# Patient Record
Sex: MALE | Race: WHITE | NOT HISPANIC OR LATINO | URBAN - METROPOLITAN AREA
[De-identification: names, ages, dates, MRNs, and addresses within clinical notes are randomized per-mention and may not be internally consistent; named-entity substitution may affect disease eponyms.]

---

## 2020-11-16 ENCOUNTER — OFFICE VISIT (OUTPATIENT)
Dept: ENDOCRINOLOGY | Facility: CLINIC | Age: 45
End: 2020-11-16

## 2020-11-16 ENCOUNTER — LAB (OUTPATIENT)
Dept: LAB | Facility: HOSPITAL | Age: 45
End: 2020-11-16

## 2020-11-16 VITALS
HEIGHT: 74 IN | SYSTOLIC BLOOD PRESSURE: 98 MMHG | DIASTOLIC BLOOD PRESSURE: 64 MMHG | HEART RATE: 60 BPM | WEIGHT: 209.2 LBS | BODY MASS INDEX: 26.85 KG/M2

## 2020-11-16 DIAGNOSIS — E05.00 GRAVES' DISEASE: ICD-10-CM

## 2020-11-16 DIAGNOSIS — E05.00 GRAVES' DISEASE: Primary | ICD-10-CM

## 2020-11-16 PROBLEM — E05.90 HYPERTHYROIDISM: Status: ACTIVE | Noted: 2020-11-16

## 2020-11-16 LAB
ALBUMIN SERPL-MCNC: 4.7 G/DL (ref 3.5–5.2)
ALBUMIN/GLOB SERPL: 1.6 G/DL
ALP SERPL-CCNC: 189 U/L (ref 39–117)
ALT SERPL W P-5'-P-CCNC: 30 U/L (ref 1–41)
ANION GAP SERPL CALCULATED.3IONS-SCNC: 7.6 MMOL/L (ref 5–15)
AST SERPL-CCNC: 28 U/L (ref 1–40)
BILIRUB SERPL-MCNC: 0.4 MG/DL (ref 0–1.2)
BUN SERPL-MCNC: 14 MG/DL (ref 6–20)
BUN/CREAT SERPL: 15.6 (ref 7–25)
CALCIUM SPEC-SCNC: 9.7 MG/DL (ref 8.6–10.5)
CHLORIDE SERPL-SCNC: 105 MMOL/L (ref 98–107)
CO2 SERPL-SCNC: 27.4 MMOL/L (ref 22–29)
CREAT SERPL-MCNC: 0.9 MG/DL (ref 0.76–1.27)
DEPRECATED RDW RBC AUTO: 48.1 FL (ref 37–54)
ERYTHROCYTE [DISTWIDTH] IN BLOOD BY AUTOMATED COUNT: 17.3 % (ref 12.3–15.4)
GFR SERPL CREATININE-BSD FRML MDRD: 91 ML/MIN/1.73
GLOBULIN UR ELPH-MCNC: 3 GM/DL
GLUCOSE SERPL-MCNC: 81 MG/DL (ref 65–99)
HCT VFR BLD AUTO: 51.2 % (ref 37.5–51)
HGB BLD-MCNC: 17.1 G/DL (ref 13–17.7)
MCH RBC QN AUTO: 27.8 PG (ref 26.6–33)
MCHC RBC AUTO-ENTMCNC: 33.4 G/DL (ref 31.5–35.7)
MCV RBC AUTO: 83.1 FL (ref 79–97)
PLATELET # BLD AUTO: 287 10*3/MM3 (ref 140–450)
PMV BLD AUTO: 8.7 FL (ref 6–12)
POTASSIUM SERPL-SCNC: 4.6 MMOL/L (ref 3.5–5.2)
PROT SERPL-MCNC: 7.7 G/DL (ref 6–8.5)
RBC # BLD AUTO: 6.16 10*6/MM3 (ref 4.14–5.8)
SODIUM SERPL-SCNC: 140 MMOL/L (ref 136–145)
T4 FREE SERPL-MCNC: 0.79 NG/DL (ref 0.93–1.7)
TSH SERPL DL<=0.05 MIU/L-ACNC: 0.18 UIU/ML (ref 0.27–4.2)
WBC # BLD AUTO: 7.97 10*3/MM3 (ref 3.4–10.8)

## 2020-11-16 PROCEDURE — 85027 COMPLETE CBC AUTOMATED: CPT

## 2020-11-16 PROCEDURE — 84443 ASSAY THYROID STIM HORMONE: CPT

## 2020-11-16 PROCEDURE — 80053 COMPREHEN METABOLIC PANEL: CPT

## 2020-11-16 PROCEDURE — 99213 OFFICE O/P EST LOW 20 MIN: CPT | Performed by: INTERNAL MEDICINE

## 2020-11-16 PROCEDURE — 84439 ASSAY OF FREE THYROXINE: CPT

## 2020-11-16 RX ORDER — ATENOLOL 25 MG/1
TABLET ORAL 2 TIMES DAILY
COMMUNITY
Start: 2020-09-11 | End: 2021-02-16 | Stop reason: SDUPTHER

## 2020-11-16 RX ORDER — METHIMAZOLE 10 MG/1
10 TABLET ORAL DAILY
COMMUNITY
Start: 2020-09-11 | End: 2021-11-15 | Stop reason: SDUPTHER

## 2020-11-16 NOTE — ASSESSMENT & PLAN NOTE
Check labs today.  Will send note about results.  Symptomatically some better but still having some issues with fatigue and brain fog.  No goiter or eye findings today.

## 2020-11-16 NOTE — PROGRESS NOTES
"     Office Note      Date: 2020  Patient Name: Eliseo Live  MRN: [id number]  : 1975    Chief Complaint   Patient presents with   • Graves' Disease       History of Present Illness:   Eliseo Live is a 45 y.o. male who presents for Graves' Disease    At the last visit the labs were c/w Graves' disease.  We started methimazole 20mg qd and atenolol 25mg BID.  He is tolerating this okay.  He notes resolution of tremor.  He notes fatigue.  He notes some trouble with wordfinding.  He has noted some pain in the right chest and shoulderblade.  He says it feels like a pinched nerve.  He has been able to gain back some weight.  He hasn't noted any change in the size of his neck.  He has noted some trouble with focusing his vision but it resolves with rest.      Subjective      Review of Systems:   Review of Systems   Constitutional: Positive for fatigue.   Cardiovascular: Positive for chest pain.   Gastrointestinal: Negative.    Endocrine: Negative.        The following portions of the patient's history were reviewed and updated as appropriate: allergies, current medications, past family history, past medical history, past social history, past surgical history and problem list.    Objective     Visit Vitals  BP 98/64 (BP Location: Right arm, Patient Position: Sitting, Cuff Size: Adult)   Pulse 60   Ht 188 cm (74\")   Wt 94.9 kg (209 lb 3.2 oz)   BMI 26.86 kg/m²       Physical Exam:  Physical Exam  Constitutional:       Appearance: Normal appearance.   Neck:      Thyroid: No thyroid mass, thyromegaly or thyroid tenderness.   Lymphadenopathy:      Cervical: No cervical adenopathy.   Neurological:      Mental Status: He is alert.         Labs:    TSH  No results found for: TSHBASE     Free T4  No results found for: FREET4    T3  No results found for: [id number]      TPO  No results found for: THYROIDAB    TG AB  No results found for: THGAB    TG  No results found for: THYROGLB    CBC w/DIFF  No results found for: " WBC, RBC, HGB, HCT, MCV, MCH, MCHC, RDW, RDWSD, MPV, PLT, NEUTRORELPCT, LYMPHORELPCT, MONORELPCT, EOSRELPCT, BASORELPCT, AUTOIGPER, NEUTROABS, LYMPHSABS, MONOSABS, EOSABS, BASOSABS, AUTOIGNUM, NRBC        Assessment / Plan      Assessment & Plan:  Problem List Items Addressed This Visit        Endocrine    Graves' disease - Primary    Current Assessment & Plan     Check labs today.  Will send note about results.  Symptomatically some better but still having some issues with fatigue and brain fog.  No goiter or eye findings today.         Relevant Medications    atenolol (TENORMIN) 25 MG tablet    methIMAzole (TAPAZOLE) 10 MG tablet    Other Relevant Orders    Comprehensive Metabolic Panel    CBC (No Diff)    TSH    T4, Free           Return in about 3 months (around 2/16/2021) for Recheck with TSH, free T4.    Jovanni Blackman MD   11/16/2020

## 2021-02-16 ENCOUNTER — TELEMEDICINE (OUTPATIENT)
Dept: ENDOCRINOLOGY | Facility: CLINIC | Age: 46
End: 2021-02-16

## 2021-02-16 VITALS
BODY MASS INDEX: 28.5 KG/M2 | SYSTOLIC BLOOD PRESSURE: 123 MMHG | DIASTOLIC BLOOD PRESSURE: 81 MMHG | HEART RATE: 69 BPM | WEIGHT: 222 LBS

## 2021-02-16 DIAGNOSIS — E05.00 GRAVES' DISEASE: Primary | ICD-10-CM

## 2021-02-16 PROCEDURE — 99441 PR PHYS/QHP TELEPHONE EVALUATION 5-10 MIN: CPT | Performed by: INTERNAL MEDICINE

## 2021-02-16 RX ORDER — ATENOLOL 25 MG/1
25 TABLET ORAL 2 TIMES DAILY
Qty: 60 TABLET | Refills: 5 | Status: SHIPPED | OUTPATIENT
Start: 2021-02-16 | End: 2021-06-28

## 2021-02-16 NOTE — PROGRESS NOTES
Office Note      Date: 2021  Patient Name: Eliseo Live  MRN: [id number]  : 1975    Chief Complaint   Patient presents with   • Graves' Disease       History of Present Illness:   Eliseo Live is a 45 y.o. male who presents for Graves' Disease    He remains on methimazole 10mg qd and atenolol 25mg BID.  He is tolerating this okay.  He notes resolution of tremor.  He notes much less fatigue.  He notes resolution trouble with wordfinding.  He has been able to gain back some weight.  He hasn't noted any change in the size of his neck.  He has noted some trouble with focusing his vision but it resolves with rest.           Subjective      Review of Systems:   Review of Systems   Constitutional: Negative.    Cardiovascular: Negative.    Gastrointestinal: Negative.    Endocrine: Negative.        The following portions of the patient's history were reviewed and updated as appropriate: allergies, current medications, past family history, past medical history, past social history, past surgical history and problem list.    Objective     Visit Vitals  /81   Pulse 69   Wt 101 kg (222 lb)   BMI 28.50 kg/m²       Physical Exam:  Physical Exam    Labs:    TSH  No results found for: TSHBASE     Free T4  Free T4   Date Value Ref Range Status   2020 0.79 (L) 0.93 - 1.70 ng/dL Final       T3  No results found for: [id number]      TPO  No results found for: THYROIDAB    TG AB  No results found for: THGAB    TG  No results found for: THYROGLB    CBC w/DIFF  Lab Results   Component Value Date    WBC 7.97 2020    RBC 6.16 (H) 2020    HGB 17.1 2020    HCT 51.2 (H) 2020    MCV 83.1 2020    MCH 27.8 2020    MCHC 33.4 2020    RDW 17.3 (H) 2020    RDWSD 48.1 2020    MPV 8.7 2020     2020           Assessment / Plan      Assessment & Plan:  Diagnoses and all orders for this visit:    1. Graves' disease (Primary)  Assessment &  Plan:  Symptomatically feeling better.  Will send lab slip to check TFTs.    Orders:  -     atenolol (TENORMIN) 25 MG tablet; Take 1 tablet by mouth 2 (two) times a day.  Dispense: 60 tablet; Refill: 5      Return in about 3 months (around 5/16/2021) for Recheck with TSH, free T4.    Jovanni Blackman MD   02/16/2021     You have chosen to receive care through a telephone visit. Do you consent to use a telephone visit for your medical care today? Yes    Unable to connect via [employer].  Total time spent on phone 9 minutes.

## 2021-02-17 DIAGNOSIS — E05.00 GRAVES' DISEASE: Primary | ICD-10-CM

## 2021-06-28 ENCOUNTER — LAB (OUTPATIENT)
Dept: LAB | Facility: HOSPITAL | Age: 46
End: 2021-06-28

## 2021-06-28 ENCOUNTER — OFFICE VISIT (OUTPATIENT)
Dept: ENDOCRINOLOGY | Facility: CLINIC | Age: 46
End: 2021-06-28

## 2021-06-28 VITALS
HEART RATE: 60 BPM | OXYGEN SATURATION: 98 % | BODY MASS INDEX: 26.18 KG/M2 | DIASTOLIC BLOOD PRESSURE: 78 MMHG | SYSTOLIC BLOOD PRESSURE: 115 MMHG | HEIGHT: 74 IN | WEIGHT: 204 LBS

## 2021-06-28 DIAGNOSIS — E05.00 GRAVES' DISEASE: ICD-10-CM

## 2021-06-28 DIAGNOSIS — E05.00 GRAVES' DISEASE: Primary | ICD-10-CM

## 2021-06-28 PROCEDURE — 84439 ASSAY OF FREE THYROXINE: CPT

## 2021-06-28 PROCEDURE — 84443 ASSAY THYROID STIM HORMONE: CPT

## 2021-06-28 PROCEDURE — 99213 OFFICE O/P EST LOW 20 MIN: CPT | Performed by: INTERNAL MEDICINE

## 2021-06-28 RX ORDER — CETIRIZINE HYDROCHLORIDE 10 MG/1
TABLET ORAL
COMMUNITY
Start: 2021-05-24 | End: 2022-07-05

## 2021-06-28 RX ORDER — ATORVASTATIN CALCIUM 10 MG/1
10 TABLET, FILM COATED ORAL NIGHTLY
COMMUNITY
Start: 2021-05-10

## 2021-06-28 RX ORDER — FLUTICASONE PROPIONATE 50 MCG
SPRAY, SUSPENSION (ML) NASAL
COMMUNITY
Start: 2021-05-24 | End: 2022-07-05

## 2021-06-28 RX ORDER — SILDENAFIL 100 MG/1
100 TABLET, FILM COATED ORAL AS NEEDED
COMMUNITY
Start: 2021-03-27

## 2021-06-28 RX ORDER — OMEPRAZOLE 40 MG/1
40 CAPSULE, DELAYED RELEASE ORAL DAILY
COMMUNITY
Start: 2021-05-10

## 2021-06-28 NOTE — PROGRESS NOTES
"     Office Note      Date: 2021  Patient Name: Eliseo Live  MRN: [id number]  : 1975    Chief Complaint   Patient presents with   • Graves' Disease       History of Present Illness:   Eliseo Live is a 46 y.o. male who presents for Graves' Disease    He remains on methimazole 10mg qd and atenolol 25mg BID.  He is tolerating this okay.  He notes resolution of tremor.  He some fatigue.  He notes resolution trouble with wordfinding.  His weight has stabilized.  He hasn't noted any change in the size of his neck.  He denies any eye complaints.      Subjective      Review of Systems:   Review of Systems   Constitutional: Positive for fatigue.   Cardiovascular: Negative.    Gastrointestinal: Negative.    Endocrine: Negative.        The following portions of the patient's history were reviewed and updated as appropriate: allergies, current medications, past family history, past medical history, past social history, past surgical history and problem list.    Objective     Visit Vitals  /78 (BP Location: Right arm, Patient Position: Sitting, Cuff Size: Adult)   Pulse 60   Ht 188 cm (74\")   Wt 92.5 kg (204 lb)   SpO2 98%   BMI 26.19 kg/m²       Physical Exam:  Physical Exam  Constitutional:       Appearance: Normal appearance.   Neck:      Thyroid: No thyroid mass, thyromegaly or thyroid tenderness.   Lymphadenopathy:      Cervical: No cervical adenopathy.   Neurological:      Mental Status: He is alert.         Labs:    TSH  No results found for: TSHBASE     Free T4  Free T4   Date Value Ref Range Status   2020 0.79 (L) 0.93 - 1.70 ng/dL Final       T3  No results found for: [id number]      TPO  No results found for: THYROIDAB    TG AB  No results found for: THGAB    TG  No results found for: THYROGLB    CBC w/DIFF  Lab Results   Component Value Date    WBC 7.97 2020    RBC 6.16 (H) 2020    HGB 17.1 2020    HCT 51.2 (H) 2020    MCV 83.1 2020    MCH 27.8 2020    " [facility]    RDW 17.3 (H) 11/16/2020    RDWSD 48.1 11/16/2020    MPV 8.7 11/16/2020     11/16/2020           Assessment / Plan      Assessment & Plan:  Diagnoses and all orders for this visit:    1. Graves' disease (Primary)  Assessment & Plan:  Symptomatically okay.  He misplaced his meds and was without meds for about 2 weeks.  He just started back today.  Since BP and pulse are on the lower side, will not resume atenolol.  Will contact him with results of labs.  May need to resume methimazole though.    Orders:  -     TSH; Future  -     T4, Free; Future      Return in about 3 months (around 9/28/2021) for Recheck with TSH, free T4, CMP, CBC.    Jovanni Blackman MD   06/28/2021

## 2021-06-28 NOTE — ASSESSMENT & PLAN NOTE
Symptomatically okay.  He misplaced his meds and was without meds for about 2 weeks.  He just started back today.  Since BP and pulse are on the lower side, will not resume atenolol.  Will contact him with results of labs.  May need to resume methimazole though.

## 2021-06-29 LAB
T4 FREE SERPL-MCNC: 1.14 NG/DL (ref 0.93–1.7)
TSH SERPL DL<=0.05 MIU/L-ACNC: 0.16 UIU/ML (ref 0.27–4.2)

## 2021-09-09 DIAGNOSIS — E05.00 GRAVES' DISEASE: ICD-10-CM

## 2021-09-09 RX ORDER — ATENOLOL 25 MG/1
25 TABLET ORAL 2 TIMES DAILY
Qty: 60 TABLET | Refills: 5 | Status: SHIPPED | OUTPATIENT
Start: 2021-09-09 | End: 2022-04-18

## 2021-11-12 ENCOUNTER — PATIENT MESSAGE (OUTPATIENT)
Dept: ENDOCRINOLOGY | Facility: CLINIC | Age: 46
End: 2021-11-12

## 2021-11-12 NOTE — TELEPHONE ENCOUNTER
From: Eliseo Live  To: Jovanni Blackman MD  Sent: 11/12/2021 3:23 PM EST  Subject: Appt for Monday at 3pm    Is it possible we just do a phone call instead of me driving to [place]? I had my primary complete a blood lab on Monday and was going to have them fax it to your office for you to look over and decide on any medication adjustments.

## 2021-11-15 ENCOUNTER — TELEMEDICINE (OUTPATIENT)
Dept: ENDOCRINOLOGY | Facility: CLINIC | Age: 46
End: 2021-11-15

## 2021-11-15 DIAGNOSIS — E05.00 GRAVES' DISEASE: Primary | ICD-10-CM

## 2021-11-15 PROCEDURE — 99213 OFFICE O/P EST LOW 20 MIN: CPT | Performed by: PHYSICIAN ASSISTANT

## 2021-11-15 RX ORDER — METHIMAZOLE 10 MG/1
10 TABLET ORAL DAILY
Qty: 30 TABLET | Refills: 5 | Status: SHIPPED | OUTPATIENT
Start: 2021-11-15 | End: 2022-07-05 | Stop reason: SDUPTHER

## 2021-11-15 NOTE — PROGRESS NOTES
Office Note      Date: 11/15/2021  Patient Name: Eliseo Live  MRN: [id number]  : 1975    Chief Complaint   Patient presents with   • Graves' Disease       History of Present Illness:   Eliseo Live is a 46 y.o. male who presents today for Graves' disease follow-up.  He consented for the visit to be conducted over the [employer] Zoom today.  Both parties were in the [facility].  He reports he is back on his methimazole 10 mg regularly.  He also remains on atenolol 25 mg 2 tablets daily.  Reports he feels great.  He had thyroid labs completed  and all were normal.  He denies any changes in his neck or eyes today.    Subjective      Review of Systems:  Review of Systems   Constitutional: Negative.    Cardiovascular: Negative.    Gastrointestinal: Negative.    Endocrine: Negative.    Neurological: Negative.        The following portions of the patient's history were reviewed and updated as appropriate: allergies, current medications, past family history, past medical history, past social history, past surgical history and problem list.    Objective     There were no vitals filed for this visit.  There is no height or weight on file to calculate BMI.    Physical Exam  Constitutional:       General: He is not in acute distress.     Appearance: Normal appearance.      Comments: Via [employer] Zoom video.   Neurological:      Mental Status: He is alert.             Assessment / Plan      Assessment & Plan:  1. Graves' disease  I reviewed lab results with patient.  Free T4: 1.03 (0.82-1.77)  TSH 2.95 (0.45-4.5)  Total T3 2.9 (2.0-4.4)  I reviewed lab results with Dr. Blackman prior to patient's visit.  Patient is feeling well we will continue the methimazole 10 mg daily.  He reports he has not had any issues with his heart rate so we will continue atenolol 25 mg 2 tablets daily.  I refilled his methimazole today.  He will plan a follow-up appointment in about 3 months for monitoring.  Patient to call  as needed.       Return in about 3 months (around 2/15/2022) for Recheck  okay for MyChart visit if patient prefers.    This note was dictated using Dragon voice recognition.    SEAN Burt   11/15/2021

## 2022-04-18 DIAGNOSIS — E05.00 GRAVES' DISEASE: ICD-10-CM

## 2022-04-18 RX ORDER — ATENOLOL 25 MG/1
25 TABLET ORAL
Qty: 60 TABLET | Refills: 2 | Status: SHIPPED | OUTPATIENT
Start: 2022-04-18 | End: 2022-07-05 | Stop reason: SDUPTHER

## 2022-06-29 LAB — HBA1C MFR BLD: 5.9 %

## 2022-07-05 ENCOUNTER — OFFICE VISIT (OUTPATIENT)
Dept: ENDOCRINOLOGY | Facility: CLINIC | Age: 47
End: 2022-07-05

## 2022-07-05 VITALS — WEIGHT: 220 LBS | HEIGHT: 74 IN | BODY MASS INDEX: 28.23 KG/M2

## 2022-07-05 DIAGNOSIS — E05.00 GRAVES' DISEASE: Primary | ICD-10-CM

## 2022-07-05 PROCEDURE — 99212 OFFICE O/P EST SF 10 MIN: CPT | Performed by: PHYSICIAN ASSISTANT

## 2022-07-05 RX ORDER — ACETAMINOPHEN 160 MG
1 TABLET,DISINTEGRATING ORAL DAILY
COMMUNITY
Start: 2022-05-27

## 2022-07-05 RX ORDER — METHIMAZOLE 10 MG/1
10 TABLET ORAL DAILY
Qty: 30 TABLET | Refills: 6 | Status: SHIPPED | OUTPATIENT
Start: 2022-07-05 | End: 2023-04-05

## 2022-07-05 RX ORDER — ATENOLOL 25 MG/1
25 TABLET ORAL DAILY
Qty: 30 TABLET | Refills: 6 | Status: SHIPPED | OUTPATIENT
Start: 2022-07-05

## 2022-07-05 RX ORDER — AZELASTINE HYDROCHLORIDE 137 UG/1
SPRAY, METERED NASAL DAILY PRN
COMMUNITY
Start: 2022-05-27

## 2022-07-05 RX ORDER — LEVOCETIRIZINE DIHYDROCHLORIDE 5 MG/1
5 TABLET, FILM COATED ORAL DAILY
COMMUNITY
Start: 2022-05-27

## 2022-07-05 NOTE — PROGRESS NOTES
"     Office Note      Date: 2022  Patient Name: Elsieo Live  MRN: [id number]  : 1975    Chief Complaint   Patient presents with   • Graves' Disease   • Hyperthyroidism       History of Present Illness:   Eliseo Live is a 47 y.o. male who presents today for Graves' disease.    He consented for the visit to be conducted over the telephone today.  Both parties were in the [facility].    Start time 1:38 PM   End time 1:43 PM   He remains on methimazole 10 mg daily he has been on this since the 2020.  He continues atenolol 25 mg daily.  He reports he feels well today with no concerns or complaints.  He recently had labs completed by his primary care physician and his TSH was 1.79.  His A1c was mildly elevated at 5.9%.  His primary care physician recommended healthy eating habits and physical activity and to continue to monitor this.  Denies any changes in his neck.    Subjective      Review of Systems:  Review of Systems   Constitutional: Negative.    Cardiovascular: Negative.    Gastrointestinal: Negative.    Endocrine: Negative.    Neurological: Negative.        The following portions of the patient's history were reviewed and updated as appropriate: allergies, current medications, past family history, past medical history, past social history, past surgical history and problem list.    Objective     Vitals:    22 1330   Weight: 99.8 kg (220 lb)   Height: 188 cm (74\")   PainSc: 0-No pain     Body mass index is 28.25 kg/m².    Physical Exam      Assessment / Plan      Assessment & Plan:  1. Graves' disease  His TSH on recent labs was 1.79.  He will continue methimazole 10 mg daily and atenolol 25 mg daily.  I refilled these prescriptions today.  We will plan to check back in 6 months for monitoring.  His CBC was within normal limits and his metabolic panel showed mildly elevated ALT.  We discussed that his mildly elevated hemoglobin A1c at 5.9% indicates prediabetes.  He will continue " to follow this with his primary care physician for now.       Return in about 6 months (around 1/5/2023) for Recheck sees me or Dr. Jovanni Blackman.    This note was dictated using Dragon voice recognition.    SEAN Burt   07/05/2022

## 2023-04-04 ENCOUNTER — TELEPHONE (OUTPATIENT)
Dept: ENDOCRINOLOGY | Facility: CLINIC | Age: 48
End: 2023-04-04
Payer: COMMERCIAL

## 2023-04-05 RX ORDER — METHIMAZOLE 10 MG/1
10 TABLET ORAL DAILY
Qty: 30 TABLET | Refills: 0 | Status: SHIPPED | OUTPATIENT
Start: 2023-04-05

## 2023-05-18 RX ORDER — METHIMAZOLE 10 MG/1
10 TABLET ORAL DAILY
Qty: 30 TABLET | Refills: 2 | Status: SHIPPED | OUTPATIENT
Start: 2023-05-18

## 2023-05-18 NOTE — TELEPHONE ENCOUNTER
Rx Refill Note    Requested Prescriptions     Pending Prescriptions Disp Refills   • methIMAzole (TAPAZOLE) 10 MG tablet [Pharmacy Med Name: METHIMAZOLE 10 MG TABLET 10 Tablet] 30 tablet 0     Sig: TAKE 1 TABLET BY MOUTH DAILY.        Last office visit with prescribing clinician: 7-5-22       Next office visit with prescribing clinician: Visit date not found     {    Ericka Ramirez MA  05/18/23, 16:22 EDT

## 2023-05-22 DIAGNOSIS — E05.00 GRAVES' DISEASE: ICD-10-CM

## 2023-05-22 RX ORDER — ATENOLOL 25 MG/1
25 TABLET ORAL DAILY
Qty: 30 TABLET | Refills: 0 | Status: SHIPPED | OUTPATIENT
Start: 2023-05-22

## 2023-05-22 NOTE — TELEPHONE ENCOUNTER
Rx Refill Note  Requested Prescriptions     Pending Prescriptions Disp Refills   • atenolol (TENORMIN) 25 MG tablet [Pharmacy Med Name: ATENOLOL 25 MG TABLET 25 Tablet] 30 tablet 6     Sig: TAKE 1 TABLET BY MOUTH DAILY.      Last office visit with prescribing clinician: 7/5/2022   Last telemedicine visit with prescribing clinician: 5/18/2023   Next office visit with prescribing clinician: Visit date not found                         Would you like a call back once the refill request has been completed: [] Yes [] No    If the office needs to give you a call back, can they leave a voicemail: [] Yes [] No    Carrillo Tabares MA  05/22/23, 16:25 EDT

## 2023-06-05 ENCOUNTER — OFFICE VISIT (OUTPATIENT)
Dept: ENDOCRINOLOGY | Facility: CLINIC | Age: 48
End: 2023-06-05
Payer: COMMERCIAL

## 2023-06-05 VITALS
HEART RATE: 78 BPM | DIASTOLIC BLOOD PRESSURE: 82 MMHG | HEIGHT: 74 IN | OXYGEN SATURATION: 98 % | BODY MASS INDEX: 28.23 KG/M2 | WEIGHT: 220 LBS | SYSTOLIC BLOOD PRESSURE: 136 MMHG

## 2023-06-05 DIAGNOSIS — E05.00 GRAVES' DISEASE: ICD-10-CM

## 2023-06-05 LAB
ALBUMIN SERPL-MCNC: 4.4 G/DL (ref 3.5–5.2)
ALBUMIN/GLOB SERPL: 1.4 G/DL
ALP SERPL-CCNC: 82 U/L (ref 39–117)
ALT SERPL W P-5'-P-CCNC: 32 U/L (ref 1–41)
ANION GAP SERPL CALCULATED.3IONS-SCNC: 12.7 MMOL/L (ref 5–15)
AST SERPL-CCNC: 30 U/L (ref 1–40)
BILIRUB SERPL-MCNC: 0.3 MG/DL (ref 0–1.2)
BUN SERPL-MCNC: 29 MG/DL (ref 6–20)
BUN/CREAT SERPL: 24.6 (ref 7–25)
CALCIUM SPEC-SCNC: 10.1 MG/DL (ref 8.6–10.5)
CHLORIDE SERPL-SCNC: 105 MMOL/L (ref 98–107)
CO2 SERPL-SCNC: 22.3 MMOL/L (ref 22–29)
CREAT SERPL-MCNC: 1.18 MG/DL (ref 0.76–1.27)
DEPRECATED RDW RBC AUTO: 43.2 FL (ref 37–54)
EGFRCR SERPLBLD CKD-EPI 2021: 75.6 ML/MIN/1.73
ERYTHROCYTE [DISTWIDTH] IN BLOOD BY AUTOMATED COUNT: 13.6 % (ref 12.3–15.4)
GLOBULIN UR ELPH-MCNC: 3.1 GM/DL
GLUCOSE SERPL-MCNC: 107 MG/DL (ref 65–99)
HCT VFR BLD AUTO: 49.9 % (ref 37.5–51)
HGB BLD-MCNC: 17 G/DL (ref 13–17.7)
MCH RBC QN AUTO: 29.6 PG (ref 26.6–33)
MCHC RBC AUTO-ENTMCNC: 34.1 G/DL (ref 31.5–35.7)
MCV RBC AUTO: 86.9 FL (ref 79–97)
PLATELET # BLD AUTO: 240 10*3/MM3 (ref 140–450)
PMV BLD AUTO: 9.2 FL (ref 6–12)
POTASSIUM SERPL-SCNC: 5.4 MMOL/L (ref 3.5–5.2)
PROT SERPL-MCNC: 7.5 G/DL (ref 6–8.5)
RBC # BLD AUTO: 5.74 10*6/MM3 (ref 4.14–5.8)
SODIUM SERPL-SCNC: 140 MMOL/L (ref 136–145)
T4 FREE SERPL-MCNC: 1.07 NG/DL (ref 0.93–1.7)
TSH SERPL DL<=0.05 MIU/L-ACNC: 2.13 UIU/ML (ref 0.27–4.2)
WBC NRBC COR # BLD: 8.37 10*3/MM3 (ref 3.4–10.8)

## 2023-06-05 PROCEDURE — 84439 ASSAY OF FREE THYROXINE: CPT | Performed by: PHYSICIAN ASSISTANT

## 2023-06-05 PROCEDURE — 80050 GENERAL HEALTH PANEL: CPT | Performed by: PHYSICIAN ASSISTANT

## 2023-06-05 RX ORDER — ATENOLOL 25 MG/1
25 TABLET ORAL DAILY
Qty: 90 TABLET | Refills: 3 | Status: SHIPPED | OUTPATIENT
Start: 2023-06-05

## 2023-06-05 RX ORDER — ATENOLOL 25 MG/1
25 TABLET ORAL DAILY
Qty: 90 TABLET | Refills: 3 | Status: SHIPPED | OUTPATIENT
Start: 2023-06-05 | End: 2023-06-05 | Stop reason: SDUPTHER

## 2023-06-05 NOTE — PROGRESS NOTES
"     Office Note      Date: 2023  Patient Name: Eliseo Live  MRN: [id number]  : 1975    Chief Complaint   Patient presents with    Graves' Disease       History of Present Illness:   Eliseo Live is a 48 y.o. male who presents today for follow-up for Graves' disease.  He remains on methimazole 10 mg daily.  He started methimazole fall .  He reports he feels well on this dose and denies any symptoms of hyper or hypothyroidism.  He also remains on atenolol 25 mg daily.  He denies any changes in his eyes or neck today.    Subjective      Review of Systems:  Review of Systems   Constitutional: Negative.    Cardiovascular: Negative.    Gastrointestinal: Negative.    Endocrine: Negative.    Neurological: Negative.      The following portions of the patient's history were reviewed and updated as appropriate: allergies, current medications, past family history, past medical history, past social history, past surgical history, and problem list.    Objective     Vitals:    23 0835   BP: 136/82   Pulse: 78   SpO2: 98%   Weight: 99.8 kg (220 lb)   Height: 188 cm (74\")     Body mass index is 28.25 kg/m².    Physical Exam  Vitals reviewed.   Constitutional:       General: He is not in acute distress.     Appearance: Normal appearance.   Neck:      Thyroid: No thyroid mass, thyromegaly or thyroid tenderness.   Lymphadenopathy:      Cervical: No cervical adenopathy.   Neurological:      Mental Status: He is alert.         Assessment / Plan      Assessment & Plan:  1. Graves' disease  He had TFTs in April which were within normal limits.  We will check TFTs today as well as a CMP and CBC.  Will send note with results and plan.  For now he will continue methimazole 10 mg daily.  I will refill his prescription once his labs have been reviewed.  I refilled his atenolol 25 mg daily today.  We will plan to check back in in 1 year unless anything changes in the interim.  Patient to call as needed.  - T4, Free; " Future  - TSH; Future  - Comprehensive Metabolic Panel; Future  - CBC (No Diff); Future  - atenolol (TENORMIN) 25 MG tablet; Take 1 tablet by mouth Daily.  Dispense: 90 tablet; Refill: 3  - T4, Free  - TSH  - Comprehensive Metabolic Panel  - CBC (No Diff)       Return in about 1 year (around 6/5/2024) for Recheck.    This note was dictated using Dragon voice recognition.    SEAN Burt   06/05/2023

## 2023-06-06 ENCOUNTER — PATIENT MESSAGE (OUTPATIENT)
Dept: ENDOCRINOLOGY | Facility: CLINIC | Age: 48
End: 2023-06-06
Payer: COMMERCIAL

## 2023-06-06 RX ORDER — METHIMAZOLE 5 MG/1
5 TABLET ORAL DAILY
Qty: 90 TABLET | Refills: 1 | Status: SHIPPED | OUTPATIENT
Start: 2023-06-06

## 2023-06-08 NOTE — TELEPHONE ENCOUNTER
From: Kaci Lopez  To: Eliseo Live  Sent: 6/6/2023 7:38 AM EDT  Subject: Lab results    Good morning,     I reviewed your thyroid labs and your labs are normal. Your TSH has crept up some, but is still normal. I think we could consider reducing your methimazole dose, but we would need to see you back in about 3-4 months for monitoring on the reduced dose. Do you want to try reducing the methimazole or do you prefer to continue the same for now? If we continue the same I think it would be a good idea to have you follow up in about 6 months for monitoring. Let me know what you would like to do.   Your blood cell counts were normal. Your creatinine( kidney test) is normal, but your BUN was mildly elevated this may indicate you were a little dehydrated when the labs were drawn. Your potassium was also mildly elevated. I do not see any medications on your med list that would cause this. I would suggest checking in with your PCP for repeat potassium in several months, or we can check it again at your follow up appointment.   Your liver enzymes were normal.   I hope this finds you well. Let me know if you have any questions or concerns. Kaci

## 2023-06-12 ENCOUNTER — OFFICE VISIT (OUTPATIENT)
Dept: CARDIOLOGY | Facility: CLINIC | Age: 48
End: 2023-06-12
Payer: COMMERCIAL

## 2023-06-12 VITALS
WEIGHT: 222 LBS | DIASTOLIC BLOOD PRESSURE: 70 MMHG | BODY MASS INDEX: 28.49 KG/M2 | SYSTOLIC BLOOD PRESSURE: 122 MMHG | HEART RATE: 64 BPM | HEIGHT: 74 IN | OXYGEN SATURATION: 99 %

## 2023-06-12 DIAGNOSIS — E78.5 HYPERLIPIDEMIA, UNSPECIFIED HYPERLIPIDEMIA TYPE: ICD-10-CM

## 2023-06-12 DIAGNOSIS — G47.10 HYPERSOMNIA, UNSPECIFIED: Primary | ICD-10-CM

## 2023-06-12 DIAGNOSIS — K21.00 GASTROESOPHAGEAL REFLUX DISEASE WITH ESOPHAGITIS WITHOUT HEMORRHAGE: ICD-10-CM

## 2023-06-12 NOTE — ASSESSMENT & PLAN NOTE
He reports excessive daytime sleepiness, snoring, restless sleep, and nonrestorative sleep.  He reports frequent awakenings for unknown reason.  We will check a home sleep test for further evaluation of sleep apnea.    We will plan a watch pad home sleep test.    We went on to discuss if his home sleep test is positive if he would be willing to start PAP therapy and possible options for treating an underlying sleep breathing disorder such as sleep apnea and he is in agreement to start PAP therapy if needed.  We discussed DME companies and his preference and he reports he would prefer to use the DME company [employer] in [place] that is close to his home.
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
## 2023-06-12 NOTE — ASSESSMENT & PLAN NOTE
We discussed that an untreated sleep apnea may potentiate hyperlipidemia.  He is on a statin therapy and he reports that he just had labs for further evaluation to see if he was well controlled on a statin.

## 2023-06-12 NOTE — ASSESSMENT & PLAN NOTE
He reports that in the past that he had acid reflux but he is taking medication omeprazole and he reports the symptoms are controlled.    We discussed that sleep position may improve this with head of bed elevated.

## 2023-08-21 ENCOUNTER — OFFICE VISIT (OUTPATIENT)
Dept: CARDIOLOGY | Facility: CLINIC | Age: 48
End: 2023-08-21
Payer: COMMERCIAL

## 2023-08-21 VITALS
BODY MASS INDEX: 28.75 KG/M2 | OXYGEN SATURATION: 98 % | SYSTOLIC BLOOD PRESSURE: 118 MMHG | HEIGHT: 74 IN | DIASTOLIC BLOOD PRESSURE: 70 MMHG | HEART RATE: 57 BPM | WEIGHT: 224 LBS

## 2023-08-21 DIAGNOSIS — G47.10 HYPERSOMNIA, UNSPECIFIED: ICD-10-CM

## 2023-08-21 DIAGNOSIS — J34.2 DEVIATED SEPTUM: ICD-10-CM

## 2023-08-21 DIAGNOSIS — G47.33 OSA (OBSTRUCTIVE SLEEP APNEA): Primary | ICD-10-CM

## 2023-08-21 PROCEDURE — 99214 OFFICE O/P EST MOD 30 MIN: CPT | Performed by: NURSE PRACTITIONER

## 2023-08-21 RX ORDER — ATORVASTATIN CALCIUM 20 MG/1
TABLET, FILM COATED ORAL
COMMUNITY
Start: 2023-07-20

## 2023-08-21 NOTE — PROGRESS NOTES
Follow-Up Sleep Consult     Date:   2023  Name: Eliseo Live  :   1975  PCP: Provider, No Known    Chief Complaint   Patient presents with    Follow-up    Sleep Apnea       Subjective     History of Present Illness  Eliseo Live is a 48 y.o. male who presents today for follow-up on JAYNE.  He presents back today to review the results of his home sleep test from 2023 and to review his first download on CPAP therapy.  He reports he has been on CPAP about 1 month.  He reports he is having some difficulty adjusting to the nasal mask as he cannot breathe out of his nose all the time.  He reports he can breathe out of his nose sometimes.  He is attempted to work with his DME to get a mask replaced.    He reports he already feels an improvement.  Family reports snoring is improved.  And he reports that his daytime sleepiness has improved.    Sleep History:     JAYNE AHI REM     JAYNE therapy autoCPAP 5-20cm       Current mask used is Nasal Cushion     Device Functioning Well: Yes  Mask Fit Comfortable: yes, but he cannot breath out of his nose.   Air Flow Comfortable: Yes  DME Helpful for Supplies: Yes  Sleep is rested: No, but he has improved sleep and less EDS and less snoring.         Device Download:           The patient's relevant past medical, surgical, family, and social history reviewed and updated in Epic as appropriate.    Past Medical History:   Diagnosis Date    Graves disease     Hyperlipidemia 2023    Hyperthyroidism     Sleep apnea      History reviewed. No pertinent surgical history.    No Known Allergies  Prior to Admission medications    Medication Sig Start Date End Date Taking? Authorizing Provider   atenolol (TENORMIN) 25 MG tablet Take 1 tablet by mouth Daily. 23  Yes Kaci Lopez PA   atorvastatin (LIPITOR) 20 MG tablet  23  Yes Elysia Rogers MD   Azelastine HCl 137 MCG/SPRAY solution Daily As Needed. 22  Yes Elysia Rogers MD  "  Cholecalciferol (Vitamin D3) 50 MCG (2000 UT) capsule Take 1 capsule by mouth Daily. 5/27/22  Yes Elysia Rogers MD   levocetirizine (XYZAL) 5 MG tablet Take 1 tablet by mouth Daily. 5/27/22  Yes Elysia Rogers MD   methIMAzole (TAPAZOLE) 5 MG tablet Take 1 tablet by mouth Daily. NEW DOSE 6/6/23  Yes Kaci Lopez PA   Multiple Vitamins-Minerals (MULTIVITAMIN ADULT, MINERALS, PO) Take 1 each by mouth Daily.   Yes Elysia Rogers MD   omeprazole (priLOSEC) 40 MG capsule Take 1 capsule by mouth Daily. 5/10/21  Yes Elysia Rogers MD   sildenafil (VIAGRA) 100 MG tablet Take 1 tablet by mouth As Needed. 3/27/21  Yes Elysia Rogers MD   atorvastatin (LIPITOR) 10 MG tablet Take 1 tablet by mouth Every Night. 5/10/21 8/21/23  Elysia Rogers MD     Family History   Problem Relation Age of Onset    Hyperthyroidism Maternal Aunt     Hyperthyroidism Maternal Grandmother     No Known Problems Father        Objective     Vital Signs:  /70 (BP Location: Left arm)   Pulse 57   Ht 188 cm (74\")   Wt 102 kg (224 lb)   SpO2 98%   BMI 28.76 kg/mý              Physical Exam  HENT:      Head: Normocephalic.      Nose: Nose normal.      Mouth/Throat:      Mouth: Mucous membranes are moist.   Pulmonary:      Effort: Pulmonary effort is normal.   Skin:     General: Skin is warm and dry.   Neurological:      Mental Status: He is alert and oriented to person, place, and time.   Psychiatric:         Mood and Affect: Mood normal.         Behavior: Behavior normal.         Thought Content: Thought content normal.           PAP download reviewed: 7/22 through 8/20/2023.  30-day download.  I have reviewed and interpreted the data on the download.         Assessment and Plan     Diagnoses and all orders for this visit:    1. JAYNE (obstructive sleep apnea) (Primary)  Assessment & Plan:  New diagnosis of JAYNE.  Baseline AHI 12.  This is mild.  AHI increases to 32 NREM and this is " severe.    He was called home sleep test results and asked to start CPAP therapy.    This is his first 1 month follow-up visit on new auto CPAP.    CPAP download is reviewed with good control but suboptimal compliance.    Plan: He is encouraged to increase CPAP usage, prescription to DME to change to a fullface mask, prescription to DME to adjust his auto CPAP settings to include the pressure 7 to 12 cm.    Follow-up in the sleep clinic in about 6 to 8 weeks for his 31 to 90-day compliance visit.     Orders:  -     Cancel: PAP Therapy  -     PAP Therapy  -     Ambulatory Referral to ENT (Otolaryngology)    2. Hypersomnia, unspecified  Assessment & Plan:  He reports that he has had an improvement in his excessive daytime sleepiness and an improvement in his snoring on CPAP therapy.    He is benefiting from PAP therapy and we plan to continue PAP therapy.    Mask adjustment and slight pressure adjustment per DME order.    Patient is encouraged to increase CPAP use to include all hours of sleep.    Follow-up in the sleep clinic in about 6 to 8 weeks and we will reevaluate this symptom.      3. Deviated septum  Assessment & Plan:  He reports that he does have a difficult time breathing through his nose.  He reports that he has a history of broken nose in the past.    He is interested in seeing an ears nose and throat specialist for further evaluation and treatment of deviated septum.  He is willing to consider surgical options if he may benefit from those.  He also has coexisting seasonal allergies on nasal spray and allergy med but still having difficulty controlling seasonal allergy.    Plan: Refer to ENT in [place] Dr. Abdullahi for further evaluation and treatment.    Orders:  -     Ambulatory Referral to ENT (Otolaryngology)        Report if any new/changing symptoms immediately, Sleep risks reviewed (driving, medical, sleep death, sedating agents), and Increase pap therapy usage         Follow Up  Return in  about 7 weeks (around 10/9/2023) for JAYNE/ 31-90 days .  Patient was given instructions and counseling regarding his condition or for health maintenance advice. Please see specific information pulled into the AVS if appropriate.

## 2023-08-21 NOTE — ASSESSMENT & PLAN NOTE
He reports that he has had an improvement in his excessive daytime sleepiness and an improvement in his snoring on CPAP therapy.    He is benefiting from PAP therapy and we plan to continue PAP therapy.    Mask adjustment and slight pressure adjustment per DME order.    Patient is encouraged to increase CPAP use to include all hours of sleep.    Follow-up in the sleep clinic in about 6 to 8 weeks and we will reevaluate this symptom.

## 2023-08-21 NOTE — ASSESSMENT & PLAN NOTE
New diagnosis of JAYNE.  Baseline AHI 12.  This is mild.  AHI increases to 32 NREM and this is severe.    He was called home sleep test results and asked to start CPAP therapy.    This is his first 1 month follow-up visit on new auto CPAP.    CPAP download is reviewed with good control but suboptimal compliance.    Plan: He is encouraged to increase CPAP usage, prescription to DME to change to a fullface mask, prescription to DME to adjust his auto CPAP settings to include the pressure 7 to 12 cm.    Follow-up in the sleep clinic in about 6 to 8 weeks for his 31 to 90-day compliance visit.

## 2023-08-21 NOTE — PATIENT INSTRUCTIONS
Quality Sleep Information, Adult  Quality sleep is important for your mental and physical health. It also improves your quality of life. Quality sleep means you:  Are asleep for most of the time you are in bed.  Fall asleep within 30 minutes.  Wake up no more than once a night.   Are awake for no longer than 20 minutes if you do wake up during the night.  Most adults need 7-8 hours of quality sleep each night.  How can poor sleep affect me?  If you do not get enough quality sleep, you may have:  Mood swings.  Daytime sleepiness.  Confusion.  Decreased reaction time.  Sleep disorders, such as insomnia and sleep apnea.  Difficulty with:  Solving problems.  Coping with stress.  Paying attention.  These issues may affect your performance and productivity at work, school, and at home. Lack of sleep may also put you at higher risk for accidents, suicide, and risky behaviors.  If you do not get quality sleep you may also be at higher risk for several health problems, including:  Infections.  Type 2 diabetes.  Heart disease.  High blood pressure.  Obesity.  Worsening of long-term conditions, like arthritis, kidney disease, depression, Parkinson's disease, and epilepsy.  What actions can I take to get more quality sleep?  A sign showing that a person should not smoke.         A sign showing that a person should not drink alcohol.      Stick to a sleep schedule. Go to sleep and wake up at about the same time each day. Do not try to sleep less on weekdays and make up for lost sleep on weekends. This does not work.  Try to get about 30 minutes of exercise on most days. Do not exercise 2-3 hours before going to bed.  Limit naps during the day to 30 minutes or less.  Do not use any products that contain nicotine or tobacco, such as cigarettes or e-cigarettes. If you need help quitting, ask your health care provider.  Do not drink caffeinated beverages for at least 8 hours before going to bed. Coffee, tea, and some sodas contain  caffeine.  Do not drink alcohol close to bedtime.  Do not eat large meals close to bedtime.  Do not take naps in the late afternoon.  Try to get at least 30 minutes of sunlight every day. Morning sunlight is best.  Make time to relax before bed. Reading, listening to music, or taking a hot bath promotes quality sleep.  Make your bedroom a place that promotes quality sleep. Keep your bedroom dark, quiet, and at a comfortable room temperature. Make sure your bed is comfortable. Take out sleep distractions like TV, a computer, smartphone, and bright lights.  If you are lying awake in bed for longer than 20 minutes, get up and do a relaxing activity until you feel sleepy.  Work with your health care provider to treat medical conditions that may affect sleeping, such as:  Nasal obstruction.  Snoring.  Sleep apnea and other sleep disorders.  Talk to your health care provider if you think any of your prescription medicines may cause you to have difficulty falling or staying asleep.  If you have sleep problems, talk with a sleep consultant. If you think you have a sleep disorder, talk with your health care provider about getting evaluated by a specialist.  Where to find more information  National Sleep Foundation website: https://sleepfoundation.org  National Heart, Lung, and Blood [place] (NHLBI): www.nhlbi.nih.gov/files/docs/public/sleep/healthy_sleep.pdf  Centers for Disease Control and Prevention (CDC): www.cdc.gov/sleep/index.html  Contact a health care provider if you:  Have trouble getting to sleep or staying asleep.  Often wake up very early in the morning and cannot get back to sleep.  Have daytime sleepiness.  Have daytime sleep attacks of suddenly falling asleep and sudden muscle weakness (narcolepsy).  Have a tingling sensation in your legs with a strong urge to move your legs (restless legs syndrome).  Stop breathing briefly during sleep (sleep apnea).  Think you have a sleep disorder or are taking a medicine  that is affecting your quality of sleep.  Summary  Most adults need 7-8 hours of quality sleep each night.  Getting enough quality sleep is an important part of health and well-being.  Make your bedroom a place that promotes quality sleep and avoid things that may cause you to have poor sleep, such as alcohol, caffeine, smoking, and large meals.  Talk to your health care provider if you have trouble falling asleep or staying asleep.  This information is not intended to replace advice given to you by your health care provider. Make sure you discuss any questions you have with your health care provider.  Document Revised: 10/17/2022 Document Reviewed: 10/17/2022  [employer] Patient Education c 2022 [employer] Sleep Apnea  Sleep apnea is a condition in which breathing pauses or becomes shallow during sleep. People with sleep apnea usually snore loudly. They may have times when they gasp and stop breathing for 10 seconds or more during sleep. This may happen many times during the night.  Sleep apnea disrupts your sleep and keeps your body from getting the rest that it needs. This condition can increase your risk of certain health problems, including:  Heart attack.  Stroke.  Obesity.  Type 2 diabetes.  Heart failure.  Irregular heartbeat.  High blood pressure.  The goal of treatment is to help you breathe normally again.  What are the causes?  A normal airway compared to a blocked airway.      The most common cause of sleep apnea is a collapsed or blocked airway.  There are three kinds of sleep apnea:  Obstructive sleep apnea. This kind is caused by a blocked or collapsed airway.  Central sleep apnea. This kind happens when the part of the brain that controls breathing does not send the correct signals to the muscles that control breathing.  Mixed sleep apnea. This is a combination of obstructive and central sleep apnea.  What increases the risk?  You are more likely to develop this condition if you:  Are  overweight.  Smoke.  Have a smaller than normal airway.  Are older.  Are male.  Drink alcohol.  Take sedatives or tranquilizers.  Have a family history of sleep apnea.  Have a tongue or tonsils that are larger than normal.  What are the signs or symptoms?  Symptoms of this condition include:  Trouble staying asleep.  Loud snoring.  Morning headaches.  Waking up gasping.  Dry mouth or sore throat in the morning.  Daytime sleepiness and tiredness.  If you have daytime fatigue because of sleep apnea, you may be more likely to have:  Trouble concentrating.  Forgetfulness.  Irritability or mood swings.  Personality changes.  Feelings of depression.  Sexual dysfunction. This may include loss of interest if you are female, or erectile dysfunction if you are male.  How is this diagnosed?  This condition may be diagnosed with:  A medical history.  A physical exam.  A series of tests that are done while you are sleeping (sleep study). These tests are usually done in a sleep lab, but they may also be done at home.  How is this treated?  A person using a CPAP device.      Treatment for this condition aims to restore normal breathing and to ease symptoms during sleep. It may involve managing health issues that can affect breathing, such as high blood pressure or obesity. Treatment may include:  Sleeping on your side.  Using a decongestant if you have nasal congestion.  Avoiding the use of depressants, including alcohol, sedatives, and narcotics.  Losing weight if you are overweight.  Making changes to your diet.  Quitting smoking.  Using a device to open your airway while you sleep, such as:  An oral appliance. This is a custom-made mouthpiece that shifts your lower jaw forward.  A continuous positive airway pressure (CPAP) device. This device blows air through a mask when you breathe out (exhale).  A nasal expiratory positive airway pressure (EPAP) device. This device has valves that you put into each nostril.  A bi-level  positive airway pressure (BIPAP) device. This device blows air through a mask when you breathe in (inhale) and breathe out (exhale).  Having surgery if other treatments do not work. During surgery, excess tissue is removed to create a wider airway.  Follow these instructions at home:  Lifestyle  Make any lifestyle changes that your health care provider recommends.  Eat a healthy, well-balanced diet.  Take steps to lose weight if you are overweight.  Avoid using depressants, including alcohol, sedatives, and narcotics.  Do not use any products that contain nicotine or tobacco. These products include cigarettes, chewing tobacco, and vaping devices, such as e-cigarettes. If you need help quitting, ask your health care provider.  General instructions  Take over-the-counter and prescription medicines only as told by your health care provider.  If you were given a device to open your airway while you sleep, use it only as told by your health care provider.  If you are having surgery, make sure to tell your health care provider you have sleep apnea. You may need to bring your device with you.  Keep all follow-up visits. This is important.  Contact a health care provider if:  The device that you received to open your airway during sleep is uncomfortable or does not seem to be working.  Your symptoms do not improve.  Your symptoms get worse.  Get help right away if:  You develop:  Chest pain.  Shortness of breath.  Discomfort in your back, arms, or stomach.  You have:  Trouble speaking.  Weakness on one side of your body.  Drooping in your face.  These symptoms may represent a serious problem that is an emergency. Do not wait to see if the symptoms will go away. Get medical help right away. Call your local emergency services (911 in the [place] Do not drive yourself to the hospital.  Summary  Sleep apnea is a condition in which breathing pauses or becomes shallow during sleep.  The most common cause is a collapsed or blocked  airway.  The goal of treatment is to restore normal breathing and to ease symptoms during sleep.  This information is not intended to replace advice given to you by your health care provider. Make sure you discuss any questions you have with your health care provider.  Document Revised: 07/27/2022 Document Reviewed: 11/26/2021  [employer] Patient Education c 2022 [employer]

## 2023-08-21 NOTE — ASSESSMENT & PLAN NOTE
He reports that he does have a difficult time breathing through his nose.  He reports that he has a history of broken nose in the past.    He is interested in seeing an ears nose and throat specialist for further evaluation and treatment of deviated septum.  He is willing to consider surgical options if he may benefit from those.  He also has coexisting seasonal allergies on nasal spray and allergy med but still having difficulty controlling seasonal allergy.    Plan: Refer to ENT in [place] Dr. Abdullahi for further evaluation and treatment.

## 2023-10-02 ENCOUNTER — OFFICE VISIT (OUTPATIENT)
Dept: CARDIOLOGY | Facility: CLINIC | Age: 48
End: 2023-10-02
Payer: COMMERCIAL

## 2023-10-02 VITALS
BODY MASS INDEX: 28.49 KG/M2 | WEIGHT: 222 LBS | OXYGEN SATURATION: 97 % | HEART RATE: 95 BPM | HEIGHT: 74 IN | SYSTOLIC BLOOD PRESSURE: 140 MMHG | DIASTOLIC BLOOD PRESSURE: 90 MMHG

## 2023-10-02 DIAGNOSIS — J34.2 DEVIATED SEPTUM: ICD-10-CM

## 2023-10-02 DIAGNOSIS — G47.33 OSA (OBSTRUCTIVE SLEEP APNEA): Primary | ICD-10-CM

## 2023-10-02 DIAGNOSIS — G47.10 HYPERSOMNIA, UNSPECIFIED: ICD-10-CM

## 2023-10-02 PROCEDURE — 99214 OFFICE O/P EST MOD 30 MIN: CPT | Performed by: NURSE PRACTITIONER

## 2023-10-02 NOTE — PROGRESS NOTES
Follow-Up Sleep Consult     Date:   10/02/2023  Name: Eliseo Live  :   1975  PCP: Provider, No Known    Chief Complaint   Patient presents with    Sleep Apnea       Subjective     History of Present Illness  Eliseo Live is a 48 y.o. male who presents today for follow-up on JAYNE.  He comes in today for a 31 to 90-day follow-up visit on his new CPAP.    He is accompanied by his very helpful wife.    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  He was rushing to get to his appointment.  His blood pressure on check-in was borderline elevated.  He reports he has had his atenolol today.    He reports that he did go and see the ears nose and throat specialist who told him that his airway is very swollen.  He reports that he will start allergy testing and will be treating his allergies more aggressively.  He is hopeful that this will improve his ability to breathe through his nose  .  Sleep History:     JAYNE AHI 12/32REM     JAYNE therapy autoCPAP 5-20cm       Current mask used is nasal mask that is over the nose    Device Functioning Well: Yes  Mask Fit Comfortable: Yes  Air Flow Comfortable: Yes, but he feels like he does still need more airflow.  We called his DME this morning and they had adjusted his pressures today to auto CPAP 7 to 12 cm.  DME Helpful for Supplies: Yes  Sleep is rested: Yes        Device Download:           The patient's relevant past medical, surgical, family, and social history reviewed and updated in Epic as appropriate.    Past Medical History:   Diagnosis Date    Graves disease     Hyperlipidemia 2023    Hyperthyroidism     Sleep apnea      History reviewed. No pertinent surgical history.    No Known Allergies  Prior to Admission medications    Medication Sig Start Date End Date Taking? Authorizing Provider   atenolol (TENORMIN) 25 MG tablet Take 1 tablet by mouth Daily. 23  Yes  "Kaci Lopez PA   atorvastatin (LIPITOR) 20 MG tablet  7/20/23  Yes Elysia Rogers MD   Azelastine HCl 137 MCG/SPRAY solution Daily As Needed. 5/27/22  Yes Elysia Rogers MD   Cholecalciferol (Vitamin D3) 50 MCG (2000 UT) capsule Take 1 capsule by mouth Daily. 5/27/22  Yes Elysia Rogers MD   levocetirizine (XYZAL) 5 MG tablet Take 1 tablet by mouth Daily. 5/27/22  Yes Elysia Rogers MD   Magnesium 100 MG capsule Take  by mouth.   Yes Elysia Rogers MD   methIMAzole (TAPAZOLE) 5 MG tablet Take 1 tablet by mouth Daily. NEW DOSE 6/6/23  Yes Kaci Lopez PA   Multiple Vitamins-Minerals (MULTIVITAMIN ADULT, MINERALS, PO) Take 1 each by mouth Daily.   Yes Elysia Rogers MD   omeprazole (priLOSEC) 40 MG capsule Take 1 capsule by mouth Daily. 5/10/21  Yes Elysia Rogers MD   sildenafil (VIAGRA) 100 MG tablet Take 1 tablet by mouth As Needed. 3/27/21  Yes Elysia Rogers MD     Family History   Problem Relation Age of Onset    Hyperthyroidism Maternal Aunt     Hyperthyroidism Maternal Grandmother     No Known Problems Father        Objective     Vital Signs:  /90   Pulse 95   Ht 188 cm (74\")   Wt 101 kg (222 lb)   SpO2 97%   BMI 28.50 kg/m²              Physical Exam  HENT:      Head: Normocephalic.      Nose: Nose normal.      Mouth/Throat:      Mouth: Mucous membranes are moist.   Pulmonary:      Effort: Pulmonary effort is normal.   Skin:     General: Skin is warm and dry.   Neurological:      Mental Status: He is alert and oriented to person, place, and time.   Psychiatric:         Mood and Affect: Mood normal.         Behavior: Behavior normal.         Thought Content: Thought content normal.       The following data was reviewed by: THO Zambrano on 10/02/2023:    PAP download reviewed: 8/20 to 9/18/2023.  30-day download.  I have reviewed and interpreted the data on the download.         Assessment and Plan     Diagnoses " and all orders for this visit:    1. JAYNE (obstructive sleep apnea) (Primary)  Assessment & Plan:  Baseline AHI is 12.  This is mild sleep apnea.  AHI increases to 32 REM sleep.  This is severe.    He is on CPAP therapy.  At today's visit he has been on CPAP more than 1 month but less than 3 months.    Download reviewed today shows good control and good compliance.    His DME did get his pressure adjustment to CPAP 7 to 12 cm completed today.    After discussion with the patient he does feel like he could use a little more airflow.  We will plan a another slight pressure adjustment to include 8 to 14 cm    Orders:  -     PAP Therapy    2. Hypersomnia, unspecified  Assessment & Plan:  He reports that he is excessive daytime sleepiness is improving.  His wife reports his snoring is improving.    Plan slight pressure adjustment and follow-up in 3 months.      3. Deviated septum  Assessment & Plan:  He has seen the ears nose and throat specialist in [place].    Patient reports that his nares are swollen.  He plans to start allergy testing and working more aggressively on his allergies so he can breathe through his nose.                   Follow Up  Return in about 3 months (around 1/2/2024) for JAYNE/pressure adjustment .  Patient was given instructions and counseling regarding his condition or for health maintenance advice. Please see specific information pulled into the AVS if appropriate.

## 2023-10-02 NOTE — ASSESSMENT & PLAN NOTE
He has seen the ears nose and throat specialist in [place].    Patient reports that his nares are swollen.  He plans to start allergy testing and working more aggressively on his allergies so he can breathe through his nose.

## 2023-10-02 NOTE — ASSESSMENT & PLAN NOTE
Baseline AHI is 12.  This is mild sleep apnea.  AHI increases to 32 REM sleep.  This is severe.    He is on CPAP therapy.  At today's visit he has been on CPAP more than 1 month but less than 3 months.    Download reviewed today shows good control and good compliance.    His DME did get his pressure adjustment to CPAP 7 to 12 cm completed today.    After discussion with the patient he does feel like he could use a little more airflow.  We will plan a another slight pressure adjustment to include 8 to 14 cm

## 2023-10-02 NOTE — ASSESSMENT & PLAN NOTE
He reports that he is excessive daytime sleepiness is improving.  His wife reports his snoring is improving.    Plan slight pressure adjustment and follow-up in 3 months.

## 2024-01-08 ENCOUNTER — OFFICE VISIT (OUTPATIENT)
Dept: CARDIOLOGY | Facility: CLINIC | Age: 49
End: 2024-01-08
Payer: COMMERCIAL

## 2024-01-08 VITALS
HEIGHT: 74 IN | HEART RATE: 61 BPM | DIASTOLIC BLOOD PRESSURE: 88 MMHG | WEIGHT: 241 LBS | BODY MASS INDEX: 30.93 KG/M2 | SYSTOLIC BLOOD PRESSURE: 130 MMHG | OXYGEN SATURATION: 98 %

## 2024-01-08 DIAGNOSIS — G47.33 OSA (OBSTRUCTIVE SLEEP APNEA): Primary | ICD-10-CM

## 2024-01-08 RX ORDER — FLUTICASONE PROPIONATE 50 MCG
SPRAY, SUSPENSION (ML) NASAL
COMMUNITY
Start: 2023-12-08

## 2024-01-08 RX ORDER — MONTELUKAST SODIUM 10 MG/1
TABLET ORAL
COMMUNITY
Start: 2023-12-08

## 2024-01-08 RX ORDER — EPINEPHRINE 0.3 MG/.3ML
INJECTION SUBCUTANEOUS
COMMUNITY
Start: 2023-11-06

## 2024-01-08 NOTE — ASSESSMENT & PLAN NOTE
Baseline AHI is 12.  This is mild sleep apnea.  AHI increases to 32 REM sleep.  This is severe.    He is on CPAP.  Download is reviewed.  Good control and good compliance.    At his last visit his CPAP pressure was adjusted.  He reports today that his airflow is comfortable but he still feels like he needs a bit more airflow.    Prescription to DME of patient's choice for adjustment of auto CPAP 10 to 14 cm and CPAP supplies.    Plan follow-up in 6 months or sooner for any concerns with JAYNE or PAP.

## 2024-01-08 NOTE — PROGRESS NOTES
Follow-Up Sleep Consult     Date:   2024  Name: Eliseo Live  :   1975  PCP: Kristine Sanchez    Chief Complaint   Patient presents with    Sleep Apnea       Subjective     History of Present Illness  Eliseo Live is a 48 y.o. male who presents today for follow-up on JAYNE.  This is a short 3-month follow-up after pressure adjustments.  He reports improvement of sleep.  Improvement of symptoms.  He reports his airflow is comfortable but he still feels like he could use more airflow.    He has coexisting seasonal allergies and he is following with an ears nose and throat and allergy specialist here in [place].  He reports he is on oral meds, nasal spray and allergy shots.    Sleep History:     JAYNE AHI 12/32REM     JAYNE therapy autoCPAP 8-14 cm       Current mask used is Nasal Cushion     Device Functioning Well: Yes  Mask Fit Comfortable: Yes  Air Flow Comfortable: Yes  DME Helpful for Supplies: Yes - Nevaeh Cabrera   Sleep is rested: Yes        Device Download:                 The patient's relevant past medical, surgical, family, and social history reviewed and updated in Epic as appropriate.    Past Medical History:   Diagnosis Date    Graves disease     Hyperlipidemia 2023    Hyperthyroidism     Sleep apnea      History reviewed. No pertinent surgical history.    No Known Allergies  Prior to Admission medications    Medication Sig Start Date End Date Taking? Authorizing Provider   atenolol (TENORMIN) 25 MG tablet Take 1 tablet by mouth Daily. 23  Yes Kaci Lopez PA   atorvastatin (LIPITOR) 20 MG tablet  23  Yes Elysia Rogers MD   Azelastine HCl 137 MCG/SPRAY solution Daily As Needed. 22  Yes Elysia Rogers MD   Cholecalciferol (Vitamin D3) 50 MCG ( UT) capsule Take 1 capsule by mouth Daily. 22  Yes Elysia Rogers MD   EPINEPHrine (EPIPEN) 0.3 MG/0.3ML solution auto-injector injection  23  Yes Elysia Rogers MD  "  fluticasone (FLONASE) 50 MCG/ACT nasal spray  12/8/23  Yes Elysia Rogers MD   levocetirizine (XYZAL) 5 MG tablet Take 1 tablet by mouth Daily. 5/27/22  Yes Elysia Rogers MD   Magnesium 100 MG capsule Take  by mouth.   Yes Elysia Rogers MD   methIMAzole (TAPAZOLE) 5 MG tablet Take 1 tablet by mouth Daily. NEW DOSE 6/6/23  Yes Kaci Lopez PA   montelukast (SINGULAIR) 10 MG tablet  12/8/23  Yes Elysia oRgers MD   Multiple Vitamins-Minerals (MULTIVITAMIN ADULT, MINERALS, PO) Take 1 each by mouth Daily.   Yes Elysia Rogers MD   omeprazole (priLOSEC) 40 MG capsule Take 1 capsule by mouth Daily. 5/10/21  Yes Elysia Rogers MD   sildenafil (VIAGRA) 100 MG tablet Take 1 tablet by mouth As Needed. 3/27/21  Yes Elysia Rogers MD     Family History   Problem Relation Age of Onset    Hyperthyroidism Maternal Aunt     Hyperthyroidism Maternal Grandmother     No Known Problems Father        Objective     Vital Signs:  /88   Pulse 61   Ht 188 cm (74\")   Wt 109 kg (241 lb)   SpO2 98%   BMI 30.94 kg/m²              Physical Exam  HENT:      Head: Normocephalic.      Nose: Nose normal.      Mouth/Throat:      Mouth: Mucous membranes are moist.   Pulmonary:      Effort: Pulmonary effort is normal.   Skin:     General: Skin is warm and dry.   Neurological:      Mental Status: He is alert and oriented to person, place, and time.   Psychiatric:         Mood and Affect: Mood normal.         Behavior: Behavior normal.         Thought Content: Thought content normal.         The following data was reviewed by: THO Zambrano on 01/08/2024:    PAP download reviewed: 12/6/2023 through January 4, 2024.  30-day download.  I have reviewed and interpreted the data on the download.         Assessment and Plan     Diagnoses and all orders for this visit:    1. JAYNE (obstructive sleep apnea) (Primary)  Assessment & Plan:  Baseline AHI is 12.  This is mild sleep " apnea.  AHI increases to 32 REM sleep.  This is severe.    He is on CPAP.  Download is reviewed.  Good control and good compliance.    At his last visit his CPAP pressure was adjusted.  He reports today that his airflow is comfortable but he still feels like he needs a bit more airflow.    Prescription to DME of patient's choice for adjustment of auto CPAP 10 to 14 cm and CPAP supplies.    Plan follow-up in 6 months or sooner for any concerns with JAYNE or PAP.       Orders:  -     PAP Therapy        Report if any new/changing symptoms immediately         Follow Up  Return in about 6 months (around 7/8/2024) for JAYNE/pressure adjustment .  Patient was given instructions and counseling regarding his condition or for health maintenance advice. Please see specific information pulled into the AVS if appropriate.

## 2024-01-26 RX ORDER — METHIMAZOLE 5 MG/1
5 TABLET ORAL DAILY
Qty: 30 TABLET | Refills: 4 | Status: SHIPPED | OUTPATIENT
Start: 2024-01-26

## 2024-01-26 NOTE — TELEPHONE ENCOUNTER
Rx Refill Note  Requested Prescriptions     Pending Prescriptions Disp Refills    methIMAzole (TAPAZOLE) 5 MG tablet [Pharmacy Med Name: METHIMAZOLE 5 MG TABS 5 Tablet] 30 tablet 1     Sig: TAKE 1 TABLET BY MOUTH DAILY. NEW DOSE      Last office visit with prescribing clinician: 6/5/2023     Next office visit with prescribing clinician: 6/10/2024       Pierre Lovelace MA  01/26/24, 11:27 EST

## 2024-02-26 ENCOUNTER — PATIENT MESSAGE (OUTPATIENT)
Dept: ENDOCRINOLOGY | Facility: CLINIC | Age: 49
End: 2024-02-26
Payer: COMMERCIAL

## 2024-02-26 RX ORDER — METHIMAZOLE 5 MG/1
5 TABLET ORAL DAILY
Qty: 90 TABLET | Refills: 1 | Status: SHIPPED | OUTPATIENT
Start: 2024-02-26

## 2024-02-26 NOTE — TELEPHONE ENCOUNTER
Ericka Ramirez MA 2/26/2024 11:00 AM EST      ----- Message -----  From: Eliseo Live  Sent: 2/26/2024 10:22 AM EST  To: [facility]  Subject: Prescriptions     Can we set up the Atenolol-tenormin and  Methimazole-tapazol for 90 days supply?

## 2024-06-10 ENCOUNTER — OFFICE VISIT (OUTPATIENT)
Dept: ENDOCRINOLOGY | Facility: CLINIC | Age: 49
End: 2024-06-10
Payer: COMMERCIAL

## 2024-06-10 VITALS
HEART RATE: 60 BPM | BODY MASS INDEX: 31.18 KG/M2 | WEIGHT: 243 LBS | HEIGHT: 74 IN | SYSTOLIC BLOOD PRESSURE: 118 MMHG | DIASTOLIC BLOOD PRESSURE: 70 MMHG | OXYGEN SATURATION: 97 %

## 2024-06-10 DIAGNOSIS — E05.00 GRAVES' DISEASE: Primary | ICD-10-CM

## 2024-06-10 PROCEDURE — 99213 OFFICE O/P EST LOW 20 MIN: CPT | Performed by: PHYSICIAN ASSISTANT

## 2024-06-10 RX ORDER — METHIMAZOLE 5 MG/1
2.5 TABLET ORAL DAILY
Start: 2024-06-10

## 2024-06-10 NOTE — PROGRESS NOTES
"     Office Note      Date: 06/10/2024  Patient Name: Eliseo Live  MRN: [id number]  : 1975    Chief Complaint   Patient presents with    Graves' Disease    Hyperthyroidism       History of Present Illness:   Eliseo Live is a 49 y.o. male who presents today for follow-up for Graves' disease.  Started on methimazole 2020.  We reduced his methimazole dose last visit to 5 mg daily.  He reports he feels well on the reduced dose and has not noted any difference.  He remains on atenolol 25 mg daily.  Denies any symptoms of hyper or hypothyroidism.  He had labs completed last month.  He reports he does have a lot of trouble with allergies.  He has noted he feels choked at times certain ways he moves his neck.    Subjective      Review of Systems:  Review of Systems   Constitutional: Negative.    Cardiovascular: Negative.    Gastrointestinal: Negative.    Endocrine: Negative.    Neurological: Negative.        The following portions of the patient's history were reviewed and updated as appropriate: allergies, current medications, past family history, past medical history, past social history, past surgical history, and problem list.    Objective     Vitals:    06/10/24 0830   BP: 118/70   BP Location: Left arm   Patient Position: Sitting   Cuff Size: Adult   Pulse: 60   SpO2: 97%   Weight: 110 kg (243 lb)   Height: 188 cm (74\")     Body mass index is 31.2 kg/m².    Physical Exam  Vitals reviewed.   Constitutional:       General: He is not in acute distress.     Appearance: Normal appearance.   Neck:      Thyroid: No thyroid mass, thyromegaly or thyroid tenderness.   Lymphadenopathy:      Cervical: No cervical adenopathy.   Neurological:      Mental Status: He is alert.           Assessment / Plan      Assessment & Plan:  1. Graves' disease  His TSH was 1.66 on 2024.  We will try reducing his methimazole dose further to 2.5 mg daily.  He will have blood work completed on the new dose in about 3 months for " monitoring.  We may consider stopping the methimazole after this blood work.  We will plan on a 6-month follow-up.  His heart rate is low today as blood pressure is good.  We will try stopping the atenolol.  He will take 1/2 tablet for about a week and then stop the medication.  He will reach out to me if he has any trouble off the atenolol or on the lower dose of methimazole.  - T4, Free; Future  - TSH; Future       Return in about 6 months (around 12/10/2024) for Recheck.    This note was dictated using Dragon voice recognition.    Electronically signed by: SEAN Burt  06/10/2024

## 2024-07-08 ENCOUNTER — OFFICE VISIT (OUTPATIENT)
Dept: CARDIOLOGY | Facility: CLINIC | Age: 49
End: 2024-07-08
Payer: COMMERCIAL

## 2024-07-08 VITALS
SYSTOLIC BLOOD PRESSURE: 128 MMHG | DIASTOLIC BLOOD PRESSURE: 70 MMHG | HEIGHT: 74 IN | HEART RATE: 66 BPM | BODY MASS INDEX: 31.32 KG/M2 | OXYGEN SATURATION: 98 % | WEIGHT: 244 LBS

## 2024-07-08 DIAGNOSIS — G47.33 OSA (OBSTRUCTIVE SLEEP APNEA): Primary | ICD-10-CM

## 2024-07-08 PROCEDURE — 99213 OFFICE O/P EST LOW 20 MIN: CPT | Performed by: NURSE PRACTITIONER

## 2024-07-08 NOTE — ASSESSMENT & PLAN NOTE
Patient has at baseline AHI 12.  This is mild sleep apnea.  Download shows good compliance and control with an AHI of 2.  Mask fit and airflow are comfortable.  He states that he does feel like sometimes he could use more air, but he is has been doing well on his current pressure settings.  Patient states that he has restless sleep tossing and turning because he cannot get comfortable in the bed.  He does have frequent awakenings, but for the most part his sleep feels rested.  Patient is receiving benefit from PAP therapy plan to continue.  Follow-up in 1 year or sooner if having issues.

## 2024-07-08 NOTE — PROGRESS NOTES
Follow-Up Sleep Consult     Date:   2024  Name: Eliseo Live  :   1975  PCP: Kristine Sanchez    Chief Complaint   Patient presents with    Sleep Apnea       Subjective     History of Present Illness  Eliseo Live is a 49 y.o. male who presents today for follow-up on JAYNE.    Patient states that it is taking him about 6 months to get fully adjusted on the CPAP.  He states that he gets very uncomfortable in the bed and tosses and turns a lot.  He states he does have frequent awakenings and trips to the bathroom.  He reports that for the most part he is feeling more rested.    Sleep History:     JAYNE AHI REM     JAYNE therapy autoCPAP 8-14 cm     Current mask used is NM    Device Functioning Well: Yes  Mask Fit Comfortable: Yes  Air Flow Comfortable: No, Too Weak   DME Helpful for Supplies: No, Has issues with them sending supplies he does not need.  Sleep is rested: Yes        Device Download:                 The patient's relevant past medical, surgical, family, and social history reviewed and updated in Epic as appropriate.    Past Medical History:   Diagnosis Date    Graves disease     Hyperlipidemia 2023    Hyperthyroidism     Sleep apnea      History reviewed. No pertinent surgical history.    No Known Allergies  Prior to Admission medications    Medication Sig Start Date End Date Taking? Authorizing Provider   Azelastine HCl 137 MCG/SPRAY solution Daily As Needed. 22  Yes Elysia Rogers MD   Cholecalciferol (Vitamin D3) 50 MCG ( UT) capsule Take 1 capsule by mouth Daily. 22  Yes Elysia Rogers MD   EPINEPHrine (EPIPEN) 0.3 MG/0.3ML solution auto-injector injection  23  Yes Elysia Rogers MD   fluticasone (FLONASE) 50 MCG/ACT nasal spray  23  Yes Elysia Rogers MD   levocetirizine (XYZAL) 5 MG tablet Take 1 tablet by mouth Daily. 22  Yes Elysia Rogers MD   Magnesium 100 MG capsule Take  by mouth.   Yes Sue  "MD Elysia   methIMAzole (TAPAZOLE) 5 MG tablet Take 0.5 tablets by mouth Daily. PATIENT REQUESTS 90 DAY SUPPLY 6/10/24  Yes Kaci Lopez PA   montelukast (SINGULAIR) 10 MG tablet  12/8/23  Yes ProviderElysia MD   Multiple Vitamins-Minerals (MULTIVITAMIN ADULT, MINERALS, PO) Take 1 each by mouth Daily.   Yes Elysia Rogers MD   omeprazole (priLOSEC) 40 MG capsule Take 1 capsule by mouth Daily. 5/10/21  Yes Elysia Rogers MD   sildenafil (VIAGRA) 100 MG tablet Take 1 tablet by mouth As Needed. 3/27/21  Yes Elysia Rogers MD   atorvastatin (LIPITOR) 20 MG tablet  7/20/23 7/8/24  ProviderElysia MD     Family History   Problem Relation Age of Onset    Hyperthyroidism Maternal Aunt     Hyperthyroidism Maternal Grandmother     No Known Problems Father        Objective     Vital Signs:  /70 (BP Location: Left arm, Patient Position: Sitting)   Pulse 66   Ht 188 cm (74\")   Wt 111 kg (244 lb)   SpO2 98%   BMI 31.33 kg/m²     BMI is >= 30 and <35. (Class 1 Obesity). The following options were offered after discussion;: exercise counseling/recommendations        Physical Exam  Constitutional:       Appearance: Normal appearance.   Skin:     General: Skin is warm and dry.   Neurological:      General: No focal deficit present.      Mental Status: He is alert and oriented to person, place, and time.   Psychiatric:         Mood and Affect: Mood normal.         Behavior: Behavior normal.         The following data was reviewed by: THO José on 07/08/2024:    30-day download 6/5/2024 to 7/4/2024 I have reviewed interpret the data from the download.  Download shows good compliance and control with an AHI of 2.  Mask fit and airflow are comfortable.  He states sometimes it does feel like the airflow is a little weak, but for the most part he is doing fine with the pressures as they are.  Reports that his sleep is a little more rested.  Patient is receiving benefit " from PAP therapy plan to continue.         Assessment and Plan     Diagnoses and all orders for this visit:    1. JAYNE (obstructive sleep apnea) (Primary)  Assessment & Plan:  Patient has at baseline AHI 12.  This is mild sleep apnea.  Download shows good compliance and control with an AHI of 2.  Mask fit and airflow are comfortable.  He states that he does feel like sometimes he could use more air, but he is has been doing well on his current pressure settings.  Patient states that he has restless sleep tossing and turning because he cannot get comfortable in the bed.  He does have frequent awakenings, but for the most part his sleep feels rested.  Patient is receiving benefit from PAP therapy plan to continue.  Follow-up in 1 year or sooner if having issues.          Report if any new/changing symptoms immediately, Sleep risks reviewed (driving, medical, sleep death, sedating agents), and Sleep hygiene discussed         Follow Up  Return in about 1 year (around 7/8/2025) for JAYNE follow up.  Patient was given instructions and counseling regarding his condition or for health maintenance advice. Please see specific information pulled into the AVS if appropriate.

## 2024-09-10 ENCOUNTER — PATIENT MESSAGE (OUTPATIENT)
Dept: ENDOCRINOLOGY | Facility: CLINIC | Age: 49
End: 2024-09-10
Payer: COMMERCIAL

## 2024-09-10 RX ORDER — METHIMAZOLE 5 MG/1
2.5 TABLET ORAL DAILY
Start: 2024-09-10

## 2024-12-16 ENCOUNTER — OFFICE VISIT (OUTPATIENT)
Dept: ENDOCRINOLOGY | Facility: CLINIC | Age: 49
End: 2024-12-16
Payer: COMMERCIAL

## 2024-12-16 VITALS
DIASTOLIC BLOOD PRESSURE: 83 MMHG | HEART RATE: 83 BPM | HEIGHT: 74 IN | WEIGHT: 253 LBS | SYSTOLIC BLOOD PRESSURE: 122 MMHG | BODY MASS INDEX: 32.47 KG/M2 | OXYGEN SATURATION: 97 %

## 2024-12-16 DIAGNOSIS — E05.00 GRAVES' DISEASE: Primary | ICD-10-CM

## 2024-12-16 LAB
ALBUMIN SERPL-MCNC: 4.1 G/DL (ref 3.5–5.2)
ALBUMIN/GLOB SERPL: 1.5 G/DL
ALP SERPL-CCNC: 83 U/L (ref 39–117)
ALT SERPL W P-5'-P-CCNC: 50 U/L (ref 1–41)
ANION GAP SERPL CALCULATED.3IONS-SCNC: 8 MMOL/L (ref 5–15)
AST SERPL-CCNC: 28 U/L (ref 1–40)
BILIRUB SERPL-MCNC: 0.3 MG/DL (ref 0–1.2)
BUN SERPL-MCNC: 18 MG/DL (ref 6–20)
BUN/CREAT SERPL: 18.9 (ref 7–25)
CALCIUM SPEC-SCNC: 9.5 MG/DL (ref 8.6–10.5)
CHLORIDE SERPL-SCNC: 100 MMOL/L (ref 98–107)
CO2 SERPL-SCNC: 28 MMOL/L (ref 22–29)
CREAT SERPL-MCNC: 0.95 MG/DL (ref 0.76–1.27)
EGFRCR SERPLBLD CKD-EPI 2021: 97.5 ML/MIN/1.73
GLOBULIN UR ELPH-MCNC: 2.7 GM/DL
GLUCOSE SERPL-MCNC: 92 MG/DL (ref 65–99)
POTASSIUM SERPL-SCNC: 4.4 MMOL/L (ref 3.5–5.2)
PROT SERPL-MCNC: 6.8 G/DL (ref 6–8.5)
SODIUM SERPL-SCNC: 136 MMOL/L (ref 136–145)
T4 FREE SERPL-MCNC: 1.33 NG/DL (ref 0.92–1.68)
TSH SERPL DL<=0.05 MIU/L-ACNC: 0.62 UIU/ML (ref 0.27–4.2)

## 2024-12-16 PROCEDURE — 99213 OFFICE O/P EST LOW 20 MIN: CPT | Performed by: PHYSICIAN ASSISTANT

## 2024-12-16 PROCEDURE — 80053 COMPREHEN METABOLIC PANEL: CPT | Performed by: PHYSICIAN ASSISTANT

## 2024-12-16 PROCEDURE — 84439 ASSAY OF FREE THYROXINE: CPT | Performed by: PHYSICIAN ASSISTANT

## 2024-12-16 PROCEDURE — 84443 ASSAY THYROID STIM HORMONE: CPT | Performed by: PHYSICIAN ASSISTANT

## 2024-12-16 NOTE — PROGRESS NOTES
"     Office Note      Date: 2024  Patient Name: Eliseo Live  MRN: [id number]  : 1975    Chief Complaint   Patient presents with    Graves' Disease       History of Present Illness:   Eliseo Live is a 49 y.o. male who presents today for follow-up for Graves' disease he started methimazole fall .  We stopped his methimazole 2.5 mg daily in September.  He reports overall he has felt well.  He denies any new symptoms of hyper or hypothyroidism.  He reports he has noted some weight gain and continues to note fatigue.  He reports his appetite has been good though this time of year.  He denies any changes in his neck or eyes today.    Subjective      Review of Systems:  Review of Systems   Constitutional:  Positive for fatigue.   Cardiovascular: Negative.    Gastrointestinal: Negative.    Endocrine: Negative.    Neurological: Negative.        The following portions of the patient's history were reviewed and updated as appropriate: allergies, current medications, past family history, past medical history, past social history, past surgical history, and problem list.    Objective     Vitals:    24 0847   BP: 122/83   Pulse: 83   SpO2: 97%   Weight: 115 kg (253 lb)   Height: 188 cm (74\")     Body mass index is 32.48 kg/m².    Physical Exam  Vitals reviewed.   Constitutional:       General: He is not in acute distress.     Appearance: Normal appearance.   Neck:      Thyroid: No thyroid mass, thyromegaly or thyroid tenderness.      Comments: Thyroid is firm and bosselated.  No distinct nodules appreciated.  Lymphadenopathy:      Cervical: No cervical adenopathy.   Neurological:      Mental Status: He is alert.             Assessment / Plan      Assessment & Plan:  1. Graves' disease  TFTs and CMP pending today.  Will send note with results and plan.  For now he will remain off the methimazole.  Patient to call as needed.  - T4, Free; Future  - TSH; Future  - Comprehensive Metabolic Panel; Future  - " T4, Free  - TSH  - Comprehensive Metabolic Panel       Return in about 6 months (around 6/16/2025) for Recheck.    This note was dictated using Dragon voice recognition.    Electronically signed by: SEAN Burt  12/16/2024

## 2025-01-21 ENCOUNTER — TELEPHONE (OUTPATIENT)
Dept: CARDIOLOGY | Facility: CLINIC | Age: 50
End: 2025-01-21

## 2025-01-21 NOTE — TELEPHONE ENCOUNTER
Caller: Eliseo Live    Relationship: Self    Best call back number: [phone]     What is the best time to reach you: ANYTIME    Who are you requesting to speak with (clinical staff, provider,  specific staff member): PROVIDER    Do you know the name of the person who called: NA    What was the call regarding: PT WAS CHARGED WITHOUT CONSENT FOR SUPPLIES NOT NEEDED FOR CPAP MACHINE THROUGH [facility]. WAS OVER $200. PT REPORTS THEM CHARGING FOR SUPPLIES NOT AUTHORIZED AND ALSO CALLING MULTIPLE TIMES A DAY. ALSO REPORTS SPEAKING TO SOMEONE BEFORE [place] THAT WAS INCREDIBLY UNPROFESSIONAL. PT CALLED TODAY, SHELL REFUNDED AND THEN TURNED OFF RE-OCCURRING PAYMENT WHICH PT DID NOT SET UP. ELISEO THINKS SHELL TOOK CARE OF IT. PT WANTS TO KNOW IF WE CAN HELP HIM WITH THIS. PLEASE ADVISE.     Is it okay if the provider responds through MyChart: NO

## 2025-05-01 ENCOUNTER — PATIENT MESSAGE (OUTPATIENT)
Dept: ENDOCRINOLOGY | Facility: CLINIC | Age: 50
End: 2025-05-01
Payer: COMMERCIAL

## 2025-05-01 DIAGNOSIS — E05.00 GRAVES' DISEASE: Primary | ICD-10-CM

## 2025-05-22 ENCOUNTER — TELEPHONE (OUTPATIENT)
Dept: ENDOCRINOLOGY | Facility: CLINIC | Age: 50
End: 2025-05-22
Payer: COMMERCIAL

## 2025-05-22 RX ORDER — METHIMAZOLE 10 MG/1
10 TABLET ORAL DAILY
Qty: 30 TABLET | Refills: 3 | Status: SHIPPED | OUTPATIENT
Start: 2025-05-22

## 2025-05-22 NOTE — TELEPHONE ENCOUNTER
PATIENT WOULD LIKE A CALL BACK TO DISCUSS RECENT LAB RESULTS. THE RESULTS ARE SCANNED IN CHART. PHONE NUMBER [phone]

## 2025-05-22 NOTE — TELEPHONE ENCOUNTER
His thyroid labs indicate he is hyperthyroid-- we need to add back methimazole.  I have sent a prescription for methimazole 10 mg daily to his pharmacy on file.  He should keep his appointment next month for monitoring back on the medicine.  Thanks RT

## 2025-06-05 ENCOUNTER — PATIENT MESSAGE (OUTPATIENT)
Dept: ENDOCRINOLOGY | Facility: CLINIC | Age: 50
End: 2025-06-05
Payer: COMMERCIAL

## 2025-06-05 RX ORDER — ATENOLOL 25 MG/1
25 TABLET ORAL DAILY
Qty: 90 TABLET | Refills: 1 | Status: SHIPPED | OUTPATIENT
Start: 2025-06-05

## 2025-06-23 ENCOUNTER — OFFICE VISIT (OUTPATIENT)
Dept: ENDOCRINOLOGY | Facility: CLINIC | Age: 50
End: 2025-06-23
Payer: COMMERCIAL

## 2025-06-23 VITALS
DIASTOLIC BLOOD PRESSURE: 68 MMHG | BODY MASS INDEX: 29.77 KG/M2 | OXYGEN SATURATION: 96 % | WEIGHT: 232 LBS | HEIGHT: 74 IN | SYSTOLIC BLOOD PRESSURE: 116 MMHG | HEART RATE: 65 BPM

## 2025-06-23 DIAGNOSIS — E05.00 GRAVES' DISEASE: Primary | ICD-10-CM

## 2025-06-23 LAB
ALBUMIN SERPL-MCNC: 3.9 G/DL (ref 3.5–5.2)
ALBUMIN/GLOB SERPL: 1.3 G/DL
ALP SERPL-CCNC: 109 U/L (ref 39–117)
ALT SERPL W P-5'-P-CCNC: 40 U/L (ref 1–41)
ANION GAP SERPL CALCULATED.3IONS-SCNC: 10 MMOL/L (ref 5–15)
AST SERPL-CCNC: 22 U/L (ref 1–40)
BILIRUB SERPL-MCNC: 0.2 MG/DL (ref 0–1.2)
BUN SERPL-MCNC: 17 MG/DL (ref 6–20)
BUN/CREAT SERPL: 26.6 (ref 7–25)
CALCIUM SPEC-SCNC: 10.1 MG/DL (ref 8.6–10.5)
CHLORIDE SERPL-SCNC: 105 MMOL/L (ref 98–107)
CO2 SERPL-SCNC: 23 MMOL/L (ref 22–29)
CREAT SERPL-MCNC: 0.64 MG/DL (ref 0.76–1.27)
EGFRCR SERPLBLD CKD-EPI 2021: 114.6 ML/MIN/1.73
GLOBULIN UR ELPH-MCNC: 3 GM/DL
GLUCOSE SERPL-MCNC: 101 MG/DL (ref 65–99)
POTASSIUM SERPL-SCNC: 4.9 MMOL/L (ref 3.5–5.2)
PROT SERPL-MCNC: 6.9 G/DL (ref 6–8.5)
SODIUM SERPL-SCNC: 138 MMOL/L (ref 136–145)
T3 SERPL-MCNC: 254 NG/DL (ref 80–200)
T4 FREE SERPL-MCNC: 2.16 NG/DL (ref 0.92–1.68)
TSH SERPL DL<=0.05 MIU/L-ACNC: <0.005 UIU/ML (ref 0.27–4.2)

## 2025-06-23 PROCEDURE — 99213 OFFICE O/P EST LOW 20 MIN: CPT | Performed by: PHYSICIAN ASSISTANT

## 2025-06-23 PROCEDURE — 84445 ASSAY OF TSI GLOBULIN: CPT | Performed by: PHYSICIAN ASSISTANT

## 2025-06-23 PROCEDURE — 84480 ASSAY TRIIODOTHYRONINE (T3): CPT | Performed by: PHYSICIAN ASSISTANT

## 2025-06-23 PROCEDURE — 80053 COMPREHEN METABOLIC PANEL: CPT | Performed by: PHYSICIAN ASSISTANT

## 2025-06-23 PROCEDURE — 84443 ASSAY THYROID STIM HORMONE: CPT | Performed by: PHYSICIAN ASSISTANT

## 2025-06-23 PROCEDURE — 84439 ASSAY OF FREE THYROXINE: CPT | Performed by: PHYSICIAN ASSISTANT

## 2025-06-23 NOTE — PROGRESS NOTES
"     Office Note      Date: 2025  Patient Name: Eliseo Live  MRN: [id number]  : 1975    Chief Complaint   Patient presents with    Graves' Disease       History of Present Illness:   Eliseo Live is a 50 y.o. male who presents today for follow-up for Graves' disease.  We stopped his methimazole 2024 but lab results in May indicated he was hyperthyroid again.  We added back methimazole 10 mg daily but he remained symptomatic so atenolol was added recently.  He reports the atenolol has helped some but he continues to note significant heat intolerance, fatigue and feels jittery and shaky with activity.  His wife reports he is quick to anger.  He denies any heart palpitations.  He reports he is taking the methimazole consistently.  We filled out paperwork and he is currently on short-term leave from work due to his symptoms.  He denies any changes in his neck or eyes today.    Subjective      Review of Systems:  Review of Systems   Constitutional:  Positive for fatigue.   Cardiovascular: Negative.    Endocrine: Positive for heat intolerance.   Neurological:  Positive for tremors.   Psychiatric/Behavioral:  Positive for agitation.        The following portions of the patient's history were reviewed and updated as appropriate: allergies, current medications, past family history, past medical history, past social history, past surgical history, and problem list.    Objective     Vitals:    25 0752   BP: 116/68   BP Location: Left arm   Patient Position: Sitting   Cuff Size: Adult   Pulse: 65   SpO2: 96%   Weight: 105 kg (232 lb)   Height: 188 cm (74\")     Body mass index is 29.79 kg/m².    Physical Exam  Vitals reviewed.   Constitutional:       General: He is not in acute distress.     Appearance: Normal appearance.   Neck:      Thyroid: No thyroid mass, thyromegaly or thyroid tenderness.      Comments: Thyroid firm and bosselated on palpation  Lymphadenopathy:      Cervical: No cervical " adenopathy.   Neurological:      Mental Status: He is alert.           Assessment / Plan      Assessment & Plan:  1. Graves' disease  Labs pending today.  Will send note with results and plan.  For now he will remain on methimazole 10 mg daily and atenolol 25 mg daily.  We will adjust his prescription based on his lab results.  We will plan to recheck labs about 6 weeks after we make any adjustments to his dose I will mail him a lab slip if needed.  Patient to call as needed.  Atenolol has helped some and his heart rate is 65 today.  We will continue the 25 mg dose for now.  I am hopeful his symptoms will continue to improve as we get his labs under control.  His weight is down 21 pounds since his appointment in December.  - T4, Free; Future  - TSH; Future  - Thyroid Stimulating Immunoglobulin; Future  - T3; Future  - T4, Free  - TSH  - Thyroid Stimulating Immunoglobulin  - T3  - Comprehensive Metabolic Panel; Future       Return in about 4 months (around 10/23/2025) for Recheck.    This note was dictated using Dragon voice recognition.    Electronically signed by: SEAN Burt  06/23/2025

## 2025-06-24 ENCOUNTER — PATIENT MESSAGE (OUTPATIENT)
Dept: ENDOCRINOLOGY | Facility: CLINIC | Age: 50
End: 2025-06-24
Payer: COMMERCIAL

## 2025-06-24 DIAGNOSIS — E05.00 GRAVES' DISEASE: Primary | ICD-10-CM

## 2025-06-24 RX ORDER — METHIMAZOLE 10 MG/1
20 TABLET ORAL DAILY
Qty: 60 TABLET | Refills: 3 | Status: SHIPPED | OUTPATIENT
Start: 2025-06-24

## 2025-06-25 LAB — TSI SER-ACNC: 1.85 IU/L (ref 0–0.55)

## 2025-06-26 ENCOUNTER — DOCUMENTATION (OUTPATIENT)
Dept: ENDOCRINOLOGY | Facility: CLINIC | Age: 50
End: 2025-06-26
Payer: COMMERCIAL

## 2025-07-01 ENCOUNTER — TELEPHONE (OUTPATIENT)
Dept: ENDOCRINOLOGY | Facility: CLINIC | Age: 50
End: 2025-07-01

## 2025-07-01 NOTE — TELEPHONE ENCOUNTER
Caller: Eliseo Live    Relationship to patient: Self    Best call back number: [phone]     Patient is needing: PT CALLED INSTATING THAT HE IS OK WITH THE ON CALL PROVIDER COMPLETING THE FORM FOR HIS EMPLOYER. PT STATED THAT THIS HAS [employer] COMPLETED ON THE FORM THAT HE PROVIDED IN HIS [employer] MESSAGE. PT ASKED IF HE CAN GET A CALL BACK OR MESSAGE ONCE FORM IS COMPLETED AND SUBMITTED TO HIS EMPLOYER. PLEASE ADVISE.

## 2025-07-07 NOTE — TELEPHONE ENCOUNTER
Patient called office, stated that his work did not receive the FMLA forms we faxed on 7/1. Patient is wanting to know if we can fax the form to him and he will upload it to his employer. Fax # is [phone].

## 2025-08-04 ENCOUNTER — PATIENT MESSAGE (OUTPATIENT)
Dept: ENDOCRINOLOGY | Facility: CLINIC | Age: 50
End: 2025-08-04
Payer: COMMERCIAL

## 2025-08-04 DIAGNOSIS — E05.00 GRAVES' DISEASE: Primary | ICD-10-CM
